# Patient Record
Sex: MALE | URBAN - METROPOLITAN AREA
[De-identification: names, ages, dates, MRNs, and addresses within clinical notes are randomized per-mention and may not be internally consistent; named-entity substitution may affect disease eponyms.]

---

## 2017-10-13 ENCOUNTER — VIRTUAL VISIT (OUTPATIENT)
Dept: FAMILY MEDICINE | Facility: OTHER | Age: 47
End: 2017-10-13

## 2017-10-16 NOTE — PROGRESS NOTES
"Date:   Clinician: Isai Goncalves  Clinician NPI: [id number]  Patient: Yosi Traore  Patient : 1970  Patient Address: [address] [place], [place] [id number]  Patient Phone: [phone]  Visit Protocol: URI  Patient Summary:  Yosi is a 47 year old ( : 1970 ) male who initiated a Visit for cold, sinus infection, or influenza. When asked the question \"Please sign me up to receive news, health information and promotions from [employer].\", Yosi responded \"No\".    Yosi states his symptoms started suddenly 3-6 days ago.   His symptoms consist of nasal congestion, enlarged lymph nodes, malaise, myalgia, a sore throat, facial pain or pressure, a cough, and chills. Yosi also feels feverish but was unable to measure his temperature.   Symptom Details     Nasal secretions: The color of his mucus is yellow.    Cough: Yosi coughs every 5-10 minutes and his cough is more bothersome at night. Phlegm comes into his throat when he coughs. He believes the phlegm causes the cough. The color of the phlegm is yellow.     Sore throat: Yosi reports having severe throat pain, has exudate on his tonsils, and is able to swallow liquids. The lymph nodes in his neck are enlarged. He states that rashes have not appeared on the skin since the sore throat started.     Facial pain or pressure: The facial pain or pressure does not feel worse when bending or leaning forward.      Yosi denies having wheezing, rhinitis, dyspnea, ear pain, headache, and teeth pain. He also denies taking antibiotic medication for the symptoms, having recent facial or sinus surgery in the past 60 days, and double sickening.   Yosi is not sure if he has been exposed to someone with strep throat. He has recently been exposed to someone with influenza. Yosi has not been in close contact with any high risk individuals.   Weight: 200 lbs   Yosi does not smoke or use smokeless tobacco.   MEDICATIONS:  Acetaminophen (Tylenol) and " ibuprofen (Advil, Motrin)   Patient free text response: None  , ALLERGIES:  NKDA   Clinician Response:  Dear Yosi,  Based on the information you have provided, you likely have viral sinusitis  commonly called a head cold.  I am prescribing fluticasone propionate nasal (Flonase) 50 mcg/spray. Take one or two inhalations in each nostril one time a day. There are no refills with this prescription.   Unless your are allergic to the over-the-counter medication(s) below, I recommend using:   A sinus irrigation kit such as Sinus Rinse, Neti Pot, SinuCleanse (or store brand). Be sure to use sterile or previously boiled water to prevent unwanted infections.   Guaifenesin + dextromethorphan (Robitussin DM, Mucinex DM). This is an over-the-counter medication that does not require a prescription.   A decongestant such as pseudoephedrine (Sudafed or store brand) to help your symptoms. This is an over-the-counter medication that does not require a prescription.   Ibuprofen. Take 1-3 tablets (200-600mg) every 8 hours to help with the discomfort. Make sure to take the ibuprofen with food. Do not exceed 2400mg in 24 hours. This is an over-the-counter medication that does not require a prescription.   Both viruses and bacteria cause sinus infections, but only bacterial infections will improve with antibiotics. Bacterial sinus infections usually do not develop until after you have been sick for 7 to 10 days. Viral infections, which are far more common, are often starting to improve on their own by that time.  Because you have had symptoms for less than 10 days, you likely have a viral infection, so antibiotics are not recommended. If you continue to have symptoms of sinus pain and pressure for more than 10 days, please consider doing another Visit.  You will feel better faster if you take care of yourself by getting more rest and drinking plenty of liquids, especially water.  Remember to wash your hands often and stay home while you  are sick to decrease the chance you will spread your infection to others.  Try the following to help with your throat pain and discomfort:     Use throat lozenges    Gargle with warm salt water (1/4 teaspoon of salt per 8 ounce glass of water)    Suck on frozen items such as popsicles or ice cubes     Call 911 or go to the emergency room if you feel that your throat is closing off, you suddenly develop a rash, you are unable to swallow fluids, you are drooling, or you are having difficulty breathing.   Diagnosis: Viral sinusitis  Diagnosis ICD: J01.90  Prescription: fluticasone propionate (Flonase) 50mcg nasal spray 16 gm, 30 days supply. Take one or two inhalations in each nostril one time a day. Refills: 0, Refill as needed: no, Allow substitutions: yes

## 2021-05-28 ENCOUNTER — RECORDS - HEALTHEAST (OUTPATIENT)
Dept: ADMINISTRATIVE | Facility: CLINIC | Age: 51
End: 2021-05-28